# Patient Record
Sex: FEMALE | URBAN - METROPOLITAN AREA
[De-identification: names, ages, dates, MRNs, and addresses within clinical notes are randomized per-mention and may not be internally consistent; named-entity substitution may affect disease eponyms.]

---

## 2017-01-18 ENCOUNTER — INPATIENT (INPATIENT)
Facility: HOSPITAL | Age: 57
LOS: 1 days | Discharge: HOME | DRG: 310 | End: 2017-01-20
Attending: HOSPITALIST | Admitting: HOSPITALIST
Payer: COMMERCIAL

## 2017-01-18 VITALS
HEART RATE: 98 BPM | OXYGEN SATURATION: 100 % | DIASTOLIC BLOOD PRESSURE: 61 MMHG | RESPIRATION RATE: 20 BRPM | SYSTOLIC BLOOD PRESSURE: 115 MMHG | TEMPERATURE: 98 F

## 2017-01-18 DIAGNOSIS — E03.9 HYPOTHYROIDISM, UNSPECIFIED: ICD-10-CM

## 2017-01-18 DIAGNOSIS — R11.2 NAUSEA WITH VOMITING, UNSPECIFIED: ICD-10-CM

## 2017-01-18 DIAGNOSIS — Z41.8 ENCOUNTER FOR OTHER PROCEDURES FOR PURPOSES OTHER THAN REMEDYING HEALTH STATE: ICD-10-CM

## 2017-01-18 DIAGNOSIS — I10 ESSENTIAL (PRIMARY) HYPERTENSION: ICD-10-CM

## 2017-01-18 DIAGNOSIS — I48.91 UNSPECIFIED ATRIAL FIBRILLATION: ICD-10-CM

## 2017-01-18 LAB
ALBUMIN SERPL ELPH-MCNC: 4.8 G/DL — SIGNIFICANT CHANGE UP (ref 3.3–5)
ALP SERPL-CCNC: 91 U/L — SIGNIFICANT CHANGE UP (ref 40–120)
ALT FLD-CCNC: 79 U/L RC — HIGH (ref 10–45)
ANION GAP SERPL CALC-SCNC: 19 MMOL/L — HIGH (ref 5–17)
APTT BLD: 30.1 SEC — SIGNIFICANT CHANGE UP (ref 27.5–37.4)
AST SERPL-CCNC: 44 U/L — HIGH (ref 10–40)
BASOPHILS # BLD AUTO: 0 K/UL — SIGNIFICANT CHANGE UP (ref 0–0.2)
BASOPHILS NFR BLD AUTO: 0.2 % — SIGNIFICANT CHANGE UP (ref 0–2)
BILIRUB SERPL-MCNC: 0.7 MG/DL — SIGNIFICANT CHANGE UP (ref 0.2–1.2)
BUN SERPL-MCNC: 15 MG/DL — SIGNIFICANT CHANGE UP (ref 7–23)
CALCIUM SERPL-MCNC: 10.5 MG/DL — SIGNIFICANT CHANGE UP (ref 8.4–10.5)
CHLORIDE SERPL-SCNC: 100 MMOL/L — SIGNIFICANT CHANGE UP (ref 96–108)
CK MB BLD-MCNC: 3 % — SIGNIFICANT CHANGE UP (ref 0–3.5)
CK MB CFR SERPL CALC: 1.7 NG/ML — SIGNIFICANT CHANGE UP (ref 0–3.8)
CK SERPL-CCNC: 57 U/L — SIGNIFICANT CHANGE UP (ref 25–170)
CO2 SERPL-SCNC: 25 MMOL/L — SIGNIFICANT CHANGE UP (ref 22–31)
CREAT SERPL-MCNC: 0.88 MG/DL — SIGNIFICANT CHANGE UP (ref 0.5–1.3)
EOSINOPHIL # BLD AUTO: 0 K/UL — SIGNIFICANT CHANGE UP (ref 0–0.5)
EOSINOPHIL NFR BLD AUTO: 0.4 % — SIGNIFICANT CHANGE UP (ref 0–6)
GAS PNL BLDV: SIGNIFICANT CHANGE UP
GLUCOSE SERPL-MCNC: 189 MG/DL — HIGH (ref 70–99)
HCT VFR BLD CALC: 52.7 % — HIGH (ref 34.5–45)
HGB BLD-MCNC: 17.8 G/DL — HIGH (ref 11.5–15.5)
INR BLD: 1.14 RATIO — SIGNIFICANT CHANGE UP (ref 0.88–1.16)
LIDOCAIN IGE QN: 15 U/L — SIGNIFICANT CHANGE UP (ref 7–60)
LYMPHOCYTES # BLD AUTO: 0.9 K/UL — LOW (ref 1–3.3)
LYMPHOCYTES # BLD AUTO: 8.2 % — LOW (ref 13–44)
MAGNESIUM SERPL-MCNC: 1.9 MG/DL — SIGNIFICANT CHANGE UP (ref 1.6–2.6)
MCHC RBC-ENTMCNC: 28.8 PG — SIGNIFICANT CHANGE UP (ref 27–34)
MCHC RBC-ENTMCNC: 33.7 GM/DL — SIGNIFICANT CHANGE UP (ref 32–36)
MCV RBC AUTO: 85.2 FL — SIGNIFICANT CHANGE UP (ref 80–100)
MONOCYTES # BLD AUTO: 0.6 K/UL — SIGNIFICANT CHANGE UP (ref 0–0.9)
MONOCYTES NFR BLD AUTO: 5.2 % — SIGNIFICANT CHANGE UP (ref 2–14)
NEUTROPHILS # BLD AUTO: 9.2 K/UL — HIGH (ref 1.8–7.4)
NEUTROPHILS NFR BLD AUTO: 86 % — HIGH (ref 43–77)
PHOSPHATE SERPL-MCNC: 2.9 MG/DL — SIGNIFICANT CHANGE UP (ref 2.5–4.5)
PLATELET # BLD AUTO: 306 K/UL — SIGNIFICANT CHANGE UP (ref 150–400)
POTASSIUM SERPL-MCNC: 3.6 MMOL/L — SIGNIFICANT CHANGE UP (ref 3.5–5.3)
POTASSIUM SERPL-SCNC: 3.6 MMOL/L — SIGNIFICANT CHANGE UP (ref 3.5–5.3)
PROT SERPL-MCNC: 8.2 G/DL — SIGNIFICANT CHANGE UP (ref 6–8.3)
PROTHROM AB SERPL-ACNC: 12.4 SEC — SIGNIFICANT CHANGE UP (ref 10–13.1)
RBC # BLD: 6.18 M/UL — HIGH (ref 3.8–5.2)
RBC # FLD: 11 % — SIGNIFICANT CHANGE UP (ref 10.3–14.5)
SODIUM SERPL-SCNC: 144 MMOL/L — SIGNIFICANT CHANGE UP (ref 135–145)
TROPONIN T SERPL-MCNC: <0.01 NG/ML — SIGNIFICANT CHANGE UP (ref 0–0.06)
WBC # BLD: 10.7 K/UL — HIGH (ref 3.8–10.5)
WBC # FLD AUTO: 10.7 K/UL — HIGH (ref 3.8–10.5)

## 2017-01-18 PROCEDURE — 93010 ELECTROCARDIOGRAM REPORT: CPT

## 2017-01-18 PROCEDURE — 99291 CRITICAL CARE FIRST HOUR: CPT | Mod: 25

## 2017-01-18 PROCEDURE — 71010: CPT | Mod: 26

## 2017-01-18 PROCEDURE — 99223 1ST HOSP IP/OBS HIGH 75: CPT | Mod: GC

## 2017-01-18 RX ORDER — CITALOPRAM 10 MG/1
1 TABLET, FILM COATED ORAL
Qty: 0 | Refills: 0 | COMMUNITY

## 2017-01-18 RX ORDER — SODIUM CHLORIDE 9 MG/ML
3 INJECTION INTRAMUSCULAR; INTRAVENOUS; SUBCUTANEOUS ONCE
Qty: 0 | Refills: 0 | Status: COMPLETED | OUTPATIENT
Start: 2017-01-18 | End: 2017-01-18

## 2017-01-18 RX ORDER — CARVEDILOL PHOSPHATE 80 MG/1
1 CAPSULE, EXTENDED RELEASE ORAL
Qty: 0 | Refills: 0 | COMMUNITY

## 2017-01-18 RX ORDER — ACETAMINOPHEN 500 MG
650 TABLET ORAL ONCE
Qty: 0 | Refills: 0 | Status: COMPLETED | OUTPATIENT
Start: 2017-01-18 | End: 2017-01-18

## 2017-01-18 RX ORDER — ONDANSETRON 8 MG/1
4 TABLET, FILM COATED ORAL EVERY 6 HOURS
Qty: 0 | Refills: 0 | Status: DISCONTINUED | OUTPATIENT
Start: 2017-01-18 | End: 2017-01-20

## 2017-01-18 RX ORDER — SODIUM CHLORIDE 9 MG/ML
1000 INJECTION INTRAMUSCULAR; INTRAVENOUS; SUBCUTANEOUS
Qty: 0 | Refills: 0 | Status: DISCONTINUED | OUTPATIENT
Start: 2017-01-18 | End: 2017-01-20

## 2017-01-18 RX ORDER — LEVOTHYROXINE SODIUM 125 MCG
1 TABLET ORAL
Qty: 0 | Refills: 0 | COMMUNITY

## 2017-01-18 RX ORDER — OMEPRAZOLE AND SODIUM BICARBONATE 40; 1100 MG/1; MG/1
1 CAPSULE, GELATIN COATED ORAL
Qty: 0 | Refills: 0 | COMMUNITY

## 2017-01-18 RX ORDER — SODIUM CHLORIDE 9 MG/ML
2000 INJECTION INTRAMUSCULAR; INTRAVENOUS; SUBCUTANEOUS ONCE
Qty: 0 | Refills: 0 | Status: COMPLETED | OUTPATIENT
Start: 2017-01-18 | End: 2017-01-18

## 2017-01-18 RX ORDER — ONDANSETRON 8 MG/1
4 TABLET, FILM COATED ORAL ONCE
Qty: 0 | Refills: 0 | Status: COMPLETED | OUTPATIENT
Start: 2017-01-18 | End: 2017-01-18

## 2017-01-18 RX ADMIN — ONDANSETRON 4 MILLIGRAM(S): 8 TABLET, FILM COATED ORAL at 22:39

## 2017-01-18 RX ADMIN — SODIUM CHLORIDE 2000 MILLILITER(S): 9 INJECTION INTRAMUSCULAR; INTRAVENOUS; SUBCUTANEOUS at 20:33

## 2017-01-18 RX ADMIN — SODIUM CHLORIDE 3 MILLILITER(S): 9 INJECTION INTRAMUSCULAR; INTRAVENOUS; SUBCUTANEOUS at 19:24

## 2017-01-18 RX ADMIN — ONDANSETRON 4 MILLIGRAM(S): 8 TABLET, FILM COATED ORAL at 19:23

## 2017-01-18 NOTE — ED PROVIDER NOTE - MEDICAL DECISION MAKING DETAILS
56 year old female with hx and exam consistent with gastroenteritis  with sick contact. Pt also with Afib.    No hx of cardiac disease so atrial fibrillation might represent secondary effect of dehydration related to illness or primary cardiac effect. Will check labs, rate control, give IVF and Zofran, and admit for new onset Afib. 56 year old female with hx and exam consistent with gastroenteritis with sick contact. Pt also with Afib.    No hx of cardiac disease so atrial fibrillation might represent secondary effect of dehydration related to illness or primary cardiac effect. Will check labs, rate control, give IVF and Zofran, and admit for new onset Afib. 56 year old female with hx and exam consistent with gastroenteritis with sick contact. Pt also with Afib with RVR. No hx of cardiac disease so atrial fibrillation might represent secondary effect of dehydration related to illness or primary cardiac effect. Will check labs, rate control, give IVF and Zofran, and admit for new onset Afib.

## 2017-01-18 NOTE — H&P ADULT. - ATTENDING COMMENTS
In addition to above, review of outpatient pharmacy filling records brings concern for iatrogenic hyperthyroidism.  Pt recently had filled 125mcg levothyroxine increased from 25mcg?  Unclear if this was in error.  She also filled additional prescription for 25mcg of levothyroxine at different pharmacy.  Unclear which dose patient has been using.  Will check TSH and Free T4 as this may be contributing to new onset afib w/rvr as patient may be in hyperthyroid state due to excess synthroid intake.  Hold synthroid for now.  Will check abdominal flat plate as well to ensure normal bowel gas pattern given recurrent vomiting.

## 2017-01-18 NOTE — ED ADULT NURSE REASSESSMENT NOTE - NS ED NURSE REASSESS COMMENT FT1
No reports of chest pain at this time, VSS, IV site clear fluids running, waiting for room disposition, a&ox4.

## 2017-01-18 NOTE — ED ADULT NURSE NOTE - OBJECTIVE STATEMENT
Pt is a 57 yo F who came to the ED amb c/o chest pain today since lunch time, and vomiting and diarrhea since yesterday. Pt is a 55 yo F who came to the ED amb c/o chest pain today since lunch time, and vomiting and diarrhea since yesterday. Pain is a "cramping" in epigastric area. No palpitations, sob or diaphoresis. No fever/chills.

## 2017-01-18 NOTE — ED ADULT NURSE REASSESSMENT NOTE - NS ED NURSE REASSESS COMMENT FT1
Pt reports no chest pain or n/v at this time, IV site clear no redness or swelling, daughter at bedside, waiting for xray results, a&ox4.

## 2017-01-18 NOTE — ED PROVIDER NOTE - ENMT NEGATIVE STATEMENT, MLM
Ears: no ear pain and no hearing problems.Nose: no nasal congestion and no nasal drainage.Mouth/Throat: no dysphagia, no hoarseness and no throat pain.Neck: no lumps, no pain, no stiffness and no swollen glands. no sore throat, no runny nose

## 2017-01-18 NOTE — ED PROVIDER NOTE - OBJECTIVE STATEMENT
65 year old female with PMHx of HTN and thyroidism, presents with cramping epigastric/upper abdominal pain associated with N/V/D since yesterday. Epigastric pain only occurs prior to episodes of nausea and vomiting. Pt also reports onset of chest pain and palpitations today, also associated with episodes of N/V. Pt states she feels dehydrated. Denies fevers but endorses diaphoresis. Denies throat pain, congestion, cough, SOB, pain in extremities, back pain, urinary or bowel changes, numbness, tingling or weakness. Reports some dizziness. Reports sick contacts at home. 65 year old female with PMHx of HTN and thyroidism, presents with cramping epigastric/upper abdominal pain associated with N/V/D since yesterday. Epigastric pain only occurs prior to episodes of nausea and vomiting and then resolves. Pt also reports onset of chest pain and palpitations today, also associated with episodes of N/V. Pt states she feels dehydrated. Denies fevers but endorses diaphoresis. Denies throat pain, congestion, cough, SOB, pain in extremities, back pain, urinary or bowel changes, numbness, tingling or weakness. Reports some dizziness. Reports sick contacts at home- son with abdominal pain, N/V/D a few days prior.

## 2017-01-18 NOTE — ED PROVIDER NOTE - MUSCULOSKELETAL NEGATIVE STATEMENT, MLM
no back pain, no gout, no musculoskeletal pain, no neck pain, and no weakness. no musculoskeletal pain

## 2017-01-18 NOTE — ED PROVIDER NOTE - CRITICAL CARE INDICATION, MLM
patient was critically ill... Patient was critically ill with a high probability of imminent or life threatening deterioration due to a fib with RVR requiring emergent chemical cardioversion

## 2017-01-18 NOTE — H&P ADULT. - NEGATIVE CARDIOVASCULAR SYMPTOMS
no chest pain/no paroxysmal nocturnal dyspnea/no orthopnea/no dyspnea on exertion/no peripheral edema/no claudication

## 2017-01-18 NOTE — ED PROVIDER NOTE - CONSTITUTIONAL, MLM
normal... Ill appearing but nontoxic, awake, alert, oriented to person, place, time/situation and in no apparent distress.

## 2017-01-18 NOTE — H&P ADULT. - PROBLEM SELECTOR PLAN 4
Patient unsure of home levothyroxine dosage, will need to confirm Holding levothyroxine currently in setting of possible incorrect medication dosage  - check TSH, T4

## 2017-01-18 NOTE — H&P ADULT. - PROBLEM SELECTOR PLAN 3
Patient unsure of home anti-hypertensives, will need to confirm   - currently normotensive Currently normotensive, c/w HCTZ, valsartan

## 2017-01-18 NOTE — ED PROVIDER NOTE - MUSCULOSKELETAL, MLM
Spine appears normal, range of motion is not limited, no muscle or joint tenderness. No peripheral edema

## 2017-01-18 NOTE — ED PROVIDER NOTE - PROGRESS NOTE DETAILS
After Cardizem, HR now in 80's. Will administer PO and continue to evaluate. HR now increasing- electrolytes normal, cause of new a fib unclear, will bolus IVF now that no critically low lytes present, admit for further evaluation of new onset afib.  Will start on heparin gtt.

## 2017-01-18 NOTE — H&P ADULT. - PROBLEM SELECTOR PLAN 2
possible due to viral gastroenteritis, no evidence of abdominal tenderness on exam, mild transaminitis.   - IVF for hydration while nauseous  - zofran PRN nausea possible due to viral gastroenteritis, no evidence of abdominal tenderness on exam, mild transaminitis.   - IVF for hydration while nauseous  - zofran PRN nausea  - no abdominal imaging indicated currently possible due to viral gastroenteritis, no evidence of abdominal tenderness on exam, mild transaminitis.   - IVF for hydration while nauseous  - zofran PRN nausea  - check AXR

## 2017-01-18 NOTE — H&P ADULT. - HISTORY OF PRESENT ILLNESS
56F [place]-speaking w/ PMH of HTN, hypothyroidism, presenting with nausea and vomiting for 2 days.  Patient states that she started having nausea 2 days ago with persistent vomiting.  States vomiting has been occurring about every hour, small volume, mainly water.  She has not been able to eat or drink anything over this time.  Endorses epigastric pain preceding and after each vomiting episode, however pain resolves without intervention.  Endorses a few episodes of loose stools, but has not had any since this morning.  Last night she began feeling as though her heart was racing, which persisted today. Denies fevers, chills, chest pain, dyspnea, dysuria.     In the ED, T 98.4,  --> 110, /61, RR16, 98% room air.   She was found to be in AFib with RVR, received normal saline x 500cc, diltiazem 10mv IV x1, diltiazem 30mg PO x1. 56F [place] citizen-speaking w/ PMH of HTN, hypothyroidism, presenting with nausea and vomiting for 2 days.  Patient states that she started having nausea 2 days ago with persistent vomiting.  States vomiting has been occurring about every hour, small volume, mainly water.  She has not been able to eat or drink anything over this time.  Endorses epigastric pain preceding and after each vomiting episode, however pain resolves without intervention.  Endorses a few episodes of loose stools, but has not had any since this morning.  Last night she began feeling as though her heart was racing, which persisted today. Denies fevers, chills, chest pain, dyspnea, dysuria.     In the ED, T 98.4,  --> 110, /61, RR16, 98% room air.   She was found to be in AFib with RVR, received 2L NS, diltiazem 10mv IV x1, diltiazem 30mg PO x1. She was started on a heparin gtt. 56F [place]-speaking w/ PMH of HTN, hypothyroidism, presenting with nausea and vomiting for 2 days.  Patient states that she started having nausea 2 days ago with persistent vomiting.  States vomiting has been occurring about every hour, small volume, mainly water.  She has not been able to eat or drink anything over this time.  Endorses epigastric pain preceding and after each vomiting episode, however pain resolves without intervention.  Endorses a few episodes of loose stools, but has not had any since this morning.  Last night she began feeling as though her heart was racing, which persisted today. Denies fevers, chills, chest pain, dyspnea, dysuria.     Denies any sick contacts, recent travel.     In the ED, T 98.4,  --> 110, /61, RR16, 98% room air.   She was found to be in AFib with RVR, received 2L NS, diltiazem 10mv IV x1, diltiazem 30mg PO x1. She was started on a heparin gtt.

## 2017-01-18 NOTE — H&P ADULT. - PROBLEM SELECTOR PLAN 1
New atrial fibrillation with RVR, now rate-controlled s/p diltiazem.  No history of atrial fibrillation, possibly triggered by dehydration.   - c/w IVF for hydration while nauseous  - New atrial fibrillation with RVR, now rate-controlled s/p diltiazem.  No history of atrial fibrillation, possibly triggered by dehydration.   - c/w IVF for hydration while nauseous  - c/w heparin gtt for anticoagulation New atrial fibrillation with RVR, now rate-controlled s/p diltiazem.  No history of atrial fibrillation, possibly triggered by dehydration vs ischemia  - c/w IVF for hydration while nauseous  - c/w heparin gtt for anticoagulation  - trend cardiac enzymes  - check TTE for thrombus New atrial fibrillation with RVR, now rate-controlled s/p diltiazem.  No history of atrial fibrillation, possibly triggered by dehydration vs ischemia vs medication mistake.  Per review of outpatient prescriptions, appears that patient was prescribed levothyroxine 25mcg, then had one prescription for levothyroxine 125mcg - possible that patient may have taken an increased dose precipitating AFib  - check TSH, T4  - c/w home carvedilol for rate control   - c/w heparin gtt for anticoagulation  - trend cardiac enzymes  - check TTE for thrombus

## 2017-01-18 NOTE — ED PROVIDER NOTE - NS ED MD SCRIBE ATTENDING SCRIBE SECTIONS
PAST MEDICAL/SURGICAL/SOCIAL HISTORY/HIV/HISTORY OF PRESENT ILLNESS/REVIEW OF SYSTEMS/PHYSICAL EXAM/INTAKE ASSESSMENT/SCREENINGS/VITAL SIGNS( Pullset)

## 2017-01-19 LAB
ANION GAP SERPL CALC-SCNC: 16 MMOL/L — SIGNIFICANT CHANGE UP (ref 5–17)
APTT BLD: 128.3 SEC — CRITICAL HIGH (ref 27.5–37.4)
APTT BLD: 73.5 SEC — HIGH (ref 27.5–37.4)
APTT BLD: > 200 SEC (ref 27.5–37.4)
BASOPHILS # BLD AUTO: 0 K/UL — SIGNIFICANT CHANGE UP (ref 0–0.2)
BASOPHILS NFR BLD AUTO: 0.3 % — SIGNIFICANT CHANGE UP (ref 0–2)
BUN SERPL-MCNC: 14 MG/DL — SIGNIFICANT CHANGE UP (ref 7–23)
CALCIUM SERPL-MCNC: 9.3 MG/DL — SIGNIFICANT CHANGE UP (ref 8.4–10.5)
CHLORIDE SERPL-SCNC: 107 MMOL/L — SIGNIFICANT CHANGE UP (ref 96–108)
CK MB BLD-MCNC: 3.4 % — SIGNIFICANT CHANGE UP (ref 0–3.5)
CK MB CFR SERPL CALC: 1.8 NG/ML — SIGNIFICANT CHANGE UP (ref 0–3.8)
CK SERPL-CCNC: 53 U/L — SIGNIFICANT CHANGE UP (ref 25–170)
CO2 SERPL-SCNC: 24 MMOL/L — SIGNIFICANT CHANGE UP (ref 22–31)
CREAT SERPL-MCNC: 0.83 MG/DL — SIGNIFICANT CHANGE UP (ref 0.5–1.3)
EOSINOPHIL # BLD AUTO: 0 K/UL — SIGNIFICANT CHANGE UP (ref 0–0.5)
EOSINOPHIL NFR BLD AUTO: 0.4 % — SIGNIFICANT CHANGE UP (ref 0–6)
GLUCOSE SERPL-MCNC: 164 MG/DL — HIGH (ref 70–99)
HCT VFR BLD CALC: 44.6 % — SIGNIFICANT CHANGE UP (ref 34.5–45)
HCT VFR BLD CALC: 48.7 % — HIGH (ref 34.5–45)
HGB BLD-MCNC: 14.9 G/DL — SIGNIFICANT CHANGE UP (ref 11.5–15.5)
HGB BLD-MCNC: 16 G/DL — HIGH (ref 11.5–15.5)
LACTATE SERPL-SCNC: 2.4 MMOL/L — HIGH (ref 0.7–2)
LYMPHOCYTES # BLD AUTO: 1.6 K/UL — SIGNIFICANT CHANGE UP (ref 1–3.3)
LYMPHOCYTES # BLD AUTO: 14.5 % — SIGNIFICANT CHANGE UP (ref 13–44)
MAGNESIUM SERPL-MCNC: 1.8 MG/DL — SIGNIFICANT CHANGE UP (ref 1.6–2.6)
MCHC RBC-ENTMCNC: 28.4 PG — SIGNIFICANT CHANGE UP (ref 27–34)
MCHC RBC-ENTMCNC: 28.8 PG — SIGNIFICANT CHANGE UP (ref 27–34)
MCHC RBC-ENTMCNC: 32.8 GM/DL — SIGNIFICANT CHANGE UP (ref 32–36)
MCHC RBC-ENTMCNC: 33.4 GM/DL — SIGNIFICANT CHANGE UP (ref 32–36)
MCV RBC AUTO: 86.2 FL — SIGNIFICANT CHANGE UP (ref 80–100)
MCV RBC AUTO: 86.4 FL — SIGNIFICANT CHANGE UP (ref 80–100)
MONOCYTES # BLD AUTO: 1 K/UL — HIGH (ref 0–0.9)
MONOCYTES NFR BLD AUTO: 8.8 % — SIGNIFICANT CHANGE UP (ref 2–14)
NEUTROPHILS # BLD AUTO: 8.3 K/UL — HIGH (ref 1.8–7.4)
NEUTROPHILS NFR BLD AUTO: 76 % — SIGNIFICANT CHANGE UP (ref 43–77)
PHOSPHATE SERPL-MCNC: 3.5 MG/DL — SIGNIFICANT CHANGE UP (ref 2.5–4.5)
PLATELET # BLD AUTO: 254 K/UL — SIGNIFICANT CHANGE UP (ref 150–400)
PLATELET # BLD AUTO: 284 K/UL — SIGNIFICANT CHANGE UP (ref 150–400)
POTASSIUM SERPL-MCNC: 4.1 MMOL/L — SIGNIFICANT CHANGE UP (ref 3.5–5.3)
POTASSIUM SERPL-SCNC: 4.1 MMOL/L — SIGNIFICANT CHANGE UP (ref 3.5–5.3)
RBC # BLD: 5.17 M/UL — SIGNIFICANT CHANGE UP (ref 3.8–5.2)
RBC # BLD: 5.64 M/UL — HIGH (ref 3.8–5.2)
RBC # FLD: 10.9 % — SIGNIFICANT CHANGE UP (ref 10.3–14.5)
RBC # FLD: 11.1 % — SIGNIFICANT CHANGE UP (ref 10.3–14.5)
SODIUM SERPL-SCNC: 147 MMOL/L — HIGH (ref 135–145)
T4 AB SER-ACNC: 8.6 UG/DL — SIGNIFICANT CHANGE UP (ref 4.6–12)
TROPONIN T SERPL-MCNC: <0.01 NG/ML — SIGNIFICANT CHANGE UP (ref 0–0.06)
TSH SERPL-MCNC: 3.17 UIU/ML — SIGNIFICANT CHANGE UP (ref 0.27–4.2)
WBC # BLD: 10.9 K/UL — HIGH (ref 3.8–10.5)
WBC # BLD: 11.4 K/UL — HIGH (ref 3.8–10.5)
WBC # FLD AUTO: 10.9 K/UL — HIGH (ref 3.8–10.5)
WBC # FLD AUTO: 11.4 K/UL — HIGH (ref 3.8–10.5)

## 2017-01-19 PROCEDURE — 74000: CPT | Mod: 26

## 2017-01-19 PROCEDURE — 99233 SBSQ HOSP IP/OBS HIGH 50: CPT | Mod: GC

## 2017-01-19 PROCEDURE — 74177 CT ABD & PELVIS W/CONTRAST: CPT | Mod: 26

## 2017-01-19 RX ORDER — HEPARIN SODIUM 5000 [USP'U]/ML
INJECTION INTRAVENOUS; SUBCUTANEOUS
Qty: 25000 | Refills: 0 | Status: DISCONTINUED | OUTPATIENT
Start: 2017-01-19 | End: 2017-01-20

## 2017-01-19 RX ORDER — ACETAMINOPHEN 500 MG
650 TABLET ORAL EVERY 6 HOURS
Qty: 0 | Refills: 0 | Status: DISCONTINUED | OUTPATIENT
Start: 2017-01-19 | End: 2017-01-20

## 2017-01-19 RX ORDER — HEPARIN SODIUM 5000 [USP'U]/ML
6500 INJECTION INTRAVENOUS; SUBCUTANEOUS ONCE
Qty: 0 | Refills: 0 | Status: COMPLETED | OUTPATIENT
Start: 2017-01-19 | End: 2017-01-19

## 2017-01-19 RX ORDER — HEPARIN SODIUM 5000 [USP'U]/ML
6500 INJECTION INTRAVENOUS; SUBCUTANEOUS EVERY 6 HOURS
Qty: 0 | Refills: 0 | Status: DISCONTINUED | OUTPATIENT
Start: 2017-01-19 | End: 2017-01-20

## 2017-01-19 RX ORDER — METOCLOPRAMIDE HCL 10 MG
10 TABLET ORAL ONCE
Qty: 0 | Refills: 0 | Status: COMPLETED | OUTPATIENT
Start: 2017-01-19 | End: 2017-01-19

## 2017-01-19 RX ORDER — HEPARIN SODIUM 5000 [USP'U]/ML
3000 INJECTION INTRAVENOUS; SUBCUTANEOUS EVERY 6 HOURS
Qty: 0 | Refills: 0 | Status: DISCONTINUED | OUTPATIENT
Start: 2017-01-19 | End: 2017-01-20

## 2017-01-19 RX ADMIN — Medication 10 MILLIGRAM(S): at 00:19

## 2017-01-19 RX ADMIN — HEPARIN SODIUM 1500 UNIT(S)/HR: 5000 INJECTION INTRAVENOUS; SUBCUTANEOUS at 00:23

## 2017-01-19 RX ADMIN — HEPARIN SODIUM 6500 UNIT(S): 5000 INJECTION INTRAVENOUS; SUBCUTANEOUS at 00:21

## 2017-01-19 RX ADMIN — HEPARIN SODIUM 1200 UNIT(S)/HR: 5000 INJECTION INTRAVENOUS; SUBCUTANEOUS at 07:55

## 2017-01-19 RX ADMIN — ONDANSETRON 4 MILLIGRAM(S): 8 TABLET, FILM COATED ORAL at 20:16

## 2017-01-19 RX ADMIN — HEPARIN SODIUM 1000 UNIT(S)/HR: 5000 INJECTION INTRAVENOUS; SUBCUTANEOUS at 14:47

## 2017-01-19 RX ADMIN — Medication 650 MILLIGRAM(S): at 20:50

## 2017-01-19 RX ADMIN — SODIUM CHLORIDE 100 MILLILITER(S): 9 INJECTION INTRAMUSCULAR; INTRAVENOUS; SUBCUTANEOUS at 00:30

## 2017-01-19 RX ADMIN — HEPARIN SODIUM 1000 UNIT(S)/HR: 5000 INJECTION INTRAVENOUS; SUBCUTANEOUS at 22:12

## 2017-01-19 RX ADMIN — HEPARIN SODIUM 0 UNIT(S)/HR: 5000 INJECTION INTRAVENOUS; SUBCUTANEOUS at 06:48

## 2017-01-19 RX ADMIN — Medication 650 MILLIGRAM(S): at 20:17

## 2017-01-19 NOTE — PROVIDER CONTACT NOTE (CRITICAL VALUE NOTIFICATION) - ACTION/TREATMENT ORDERED:
Followed heparin nomogram. Stop heparin drip for one hour and restart in one hour at new rate (12 ml/hr).

## 2017-01-20 ENCOUNTER — TRANSCRIPTION ENCOUNTER (OUTPATIENT)
Age: 57
End: 2017-01-20

## 2017-01-20 VITALS
SYSTOLIC BLOOD PRESSURE: 130 MMHG | RESPIRATION RATE: 18 BRPM | DIASTOLIC BLOOD PRESSURE: 81 MMHG | OXYGEN SATURATION: 95 % | HEART RATE: 72 BPM | TEMPERATURE: 98 F

## 2017-01-20 LAB
APTT BLD: 76 SEC — HIGH (ref 27.5–37.4)
HCT VFR BLD CALC: 41.6 % — SIGNIFICANT CHANGE UP (ref 34.5–45)
HGB BLD-MCNC: 14 G/DL — SIGNIFICANT CHANGE UP (ref 11.5–15.5)
MCHC RBC-ENTMCNC: 29.1 PG — SIGNIFICANT CHANGE UP (ref 27–34)
MCHC RBC-ENTMCNC: 33.8 GM/DL — SIGNIFICANT CHANGE UP (ref 32–36)
MCV RBC AUTO: 86.1 FL — SIGNIFICANT CHANGE UP (ref 80–100)
PLATELET # BLD AUTO: 222 K/UL — SIGNIFICANT CHANGE UP (ref 150–400)
RBC # BLD: 4.83 M/UL — SIGNIFICANT CHANGE UP (ref 3.8–5.2)
RBC # FLD: 10.7 % — SIGNIFICANT CHANGE UP (ref 10.3–14.5)
WBC # BLD: 8.8 K/UL — SIGNIFICANT CHANGE UP (ref 3.8–10.5)
WBC # FLD AUTO: 8.8 K/UL — SIGNIFICANT CHANGE UP (ref 3.8–10.5)

## 2017-01-20 PROCEDURE — 83735 ASSAY OF MAGNESIUM: CPT

## 2017-01-20 PROCEDURE — 85730 THROMBOPLASTIN TIME PARTIAL: CPT

## 2017-01-20 PROCEDURE — 96374 THER/PROPH/DIAG INJ IV PUSH: CPT

## 2017-01-20 PROCEDURE — 74018 RADEX ABDOMEN 1 VIEW: CPT

## 2017-01-20 PROCEDURE — 96375 TX/PRO/DX INJ NEW DRUG ADDON: CPT

## 2017-01-20 PROCEDURE — 82330 ASSAY OF CALCIUM: CPT

## 2017-01-20 PROCEDURE — 84436 ASSAY OF TOTAL THYROXINE: CPT

## 2017-01-20 PROCEDURE — 84100 ASSAY OF PHOSPHORUS: CPT

## 2017-01-20 PROCEDURE — 84484 ASSAY OF TROPONIN QUANT: CPT

## 2017-01-20 PROCEDURE — 83605 ASSAY OF LACTIC ACID: CPT

## 2017-01-20 PROCEDURE — 80048 BASIC METABOLIC PNL TOTAL CA: CPT

## 2017-01-20 PROCEDURE — 82947 ASSAY GLUCOSE BLOOD QUANT: CPT

## 2017-01-20 PROCEDURE — 93005 ELECTROCARDIOGRAM TRACING: CPT

## 2017-01-20 PROCEDURE — 82553 CREATINE MB FRACTION: CPT

## 2017-01-20 PROCEDURE — 99239 HOSP IP/OBS DSCHRG MGMT >30: CPT

## 2017-01-20 PROCEDURE — 82435 ASSAY OF BLOOD CHLORIDE: CPT

## 2017-01-20 PROCEDURE — 71045 X-RAY EXAM CHEST 1 VIEW: CPT

## 2017-01-20 PROCEDURE — 84295 ASSAY OF SERUM SODIUM: CPT

## 2017-01-20 PROCEDURE — 82550 ASSAY OF CK (CPK): CPT

## 2017-01-20 PROCEDURE — 83690 ASSAY OF LIPASE: CPT

## 2017-01-20 PROCEDURE — 82803 BLOOD GASES ANY COMBINATION: CPT

## 2017-01-20 PROCEDURE — 84443 ASSAY THYROID STIM HORMONE: CPT

## 2017-01-20 PROCEDURE — 85014 HEMATOCRIT: CPT

## 2017-01-20 PROCEDURE — 80053 COMPREHEN METABOLIC PANEL: CPT

## 2017-01-20 PROCEDURE — 99291 CRITICAL CARE FIRST HOUR: CPT | Mod: 25

## 2017-01-20 PROCEDURE — 74177 CT ABD & PELVIS W/CONTRAST: CPT

## 2017-01-20 PROCEDURE — 85027 COMPLETE CBC AUTOMATED: CPT

## 2017-01-20 PROCEDURE — 84132 ASSAY OF SERUM POTASSIUM: CPT

## 2017-01-20 PROCEDURE — 85610 PROTHROMBIN TIME: CPT

## 2017-01-20 RX ORDER — APIXABAN 2.5 MG/1
5 TABLET, FILM COATED ORAL
Qty: 0 | Refills: 0 | Status: DISCONTINUED | OUTPATIENT
Start: 2017-01-20 | End: 2017-01-20

## 2017-01-20 RX ORDER — APIXABAN 2.5 MG/1
1 TABLET, FILM COATED ORAL
Qty: 60 | Refills: 0 | OUTPATIENT
Start: 2017-01-20 | End: 2017-02-19

## 2017-01-20 RX ADMIN — HEPARIN SODIUM 1000 UNIT(S)/HR: 5000 INJECTION INTRAVENOUS; SUBCUTANEOUS at 06:19

## 2017-01-20 NOTE — DISCHARGE NOTE ADULT - PLAN OF CARE
prevention of stroke and symptoms You had a transient episode of irregular heart rhythm which has returned back to normal.  To prevent complication of stroke you have to take blood thinners for life long.   If you see any episodes of bleeding stop the blood thinner and seek medical attention immediately. Advance your diet slowly  Avoid fatty food  Plenty of hydration Cont with synthroid as directed Cont with blood pressure meds You had a transient episode of irregular heart rhythm which has returned back to normal.  To prevent complication of stroke you have to take blood thinner for life long.   If you see any episodes of bleeding stop the blood thinner and seek medical attention immediately.

## 2017-01-20 NOTE — DISCHARGE NOTE ADULT - HOSPITAL COURSE
.to be completed 57 yo f with HTN, hypothyroid presented to ED c/o persistent N/V for 2 days with abd bloating/diarrhea. In the ED, patient was found to be in Afib RVR. patient was given CCB to control HR and was started heparin gtt and admitted to medicine for further care. Patient spontaneously converted back to SR. Symptoms of gastroenteritis improved. CT of A/p did not show any acute finding. Patient's diet was advance from clear to solid and patient was able to tolerate. Given risk for thromboembolic event in setting of new onset Afib (CHADsVasc=2) patient was transitioned to oral Eliquis for discharge.

## 2017-01-20 NOTE — DISCHARGE NOTE ADULT - NS MD DC FALL RISK RISK
For information on Fall & Injury Prevention, visit [email or web address]

## 2017-01-20 NOTE — DISCHARGE NOTE ADULT - CARE PLAN
Principal Discharge DX:	Paroxysmal atrial fibrillation  Goal:	prevention of stroke and symptoms  Instructions for follow-up, activity and diet:	You had a transient episode of irregular heart rhythm which has returned back to normal.  To prevent complication of stroke you have to take blood thinners for life long.   If you see any episodes of bleeding stop the blood thinner and seek medical attention immediately.  Secondary Diagnosis:	Gastroenteritis  Instructions for follow-up, activity and diet:	Advance your diet slowly  Avoid fatty food  Plenty of hydration  Secondary Diagnosis:	Hypothyroidism, unspecified type  Instructions for follow-up, activity and diet:	Cont with synthroid as directed  Secondary Diagnosis:	Essential hypertension  Instructions for follow-up, activity and diet:	Cont with blood pressure meds Principal Discharge DX:	Paroxysmal atrial fibrillation  Goal:	prevention of stroke and symptoms  Instructions for follow-up, activity and diet:	You had a transient episode of irregular heart rhythm which has returned back to normal.  To prevent complication of stroke you have to take blood thinner for life long.   If you see any episodes of bleeding stop the blood thinner and seek medical attention immediately.  Secondary Diagnosis:	Gastroenteritis  Instructions for follow-up, activity and diet:	Advance your diet slowly  Avoid fatty food  Plenty of hydration  Secondary Diagnosis:	Hypothyroidism, unspecified type  Instructions for follow-up, activity and diet:	Cont with synthroid as directed  Secondary Diagnosis:	Essential hypertension  Instructions for follow-up, activity and diet:	Cont with blood pressure meds

## 2017-01-20 NOTE — DISCHARGE NOTE ADULT - MEDICATION SUMMARY - MEDICATIONS TO TAKE
I will START or STAY ON the medications listed below when I get home from the hospital:    Eliquis 5 mg oral tablet  -- 1 tab(s) by mouth 2 times a day  -- Check with your doctor before becoming pregnant.  It is very important that you take or use this exactly as directed.  Do not skip doses or discontinue unless directed by your doctor.  Obtain medical advice before taking any non-prescription drugs as some may affect the action of this medication.    -- Indication: For Atrial fibrillation    citalopram 10 mg oral tablet  -- 1 tab(s) by mouth once a day  -- Indication: For depression    valsartan-hydroCHLOROthiazide 160mg-12.5mg oral tablet  -- 1 tab(s) by mouth once a day  -- Indication: For Essential hypertension    carvedilol 12.5 mg oral tablet  -- 1 tab(s) by mouth 2 times a day  -- Indication: For Essential hypertension    omeprazole-sodium bicarbonate 40 mg-1100 mg oral capsule  -- 1 cap(s) by mouth once a day  -- Indication: For reflux    levothyroxine 25 mcg (0.025 mg) oral tablet  -- 1 tab(s) by mouth once a day  -- Indication: For Hypothyroid